# Patient Record
Sex: MALE | Race: BLACK OR AFRICAN AMERICAN | Employment: FULL TIME | URBAN - METROPOLITAN AREA
[De-identification: names, ages, dates, MRNs, and addresses within clinical notes are randomized per-mention and may not be internally consistent; named-entity substitution may affect disease eponyms.]

---

## 2023-03-20 ENCOUNTER — HOSPITAL ENCOUNTER (EMERGENCY)
Age: 23
Discharge: HOME | End: 2023-03-20
Attending: EMERGENCY MEDICINE

## 2023-03-20 VITALS
HEART RATE: 80 BPM | RESPIRATION RATE: 18 BRPM | HEIGHT: 68 IN | SYSTOLIC BLOOD PRESSURE: 120 MMHG | BODY MASS INDEX: 22.85 KG/M2 | OXYGEN SATURATION: 100 % | TEMPERATURE: 98 F | WEIGHT: 150.79 LBS | DIASTOLIC BLOOD PRESSURE: 79 MMHG

## 2023-03-20 DIAGNOSIS — R21 RASH: Primary | ICD-10-CM

## 2023-03-20 PROCEDURE — 99283 EMERGENCY DEPT VISIT LOW MDM: CPT

## 2023-03-20 RX ORDER — DIAPER,BRIEF,INFANT-TODD,DISP
EACH MISCELLANEOUS
Qty: 30 G | Refills: 0 | Status: SHIPPED | OUTPATIENT
Start: 2023-03-20 | End: 2023-03-27

## 2023-03-20 ASSESSMENT — ENCOUNTER SYMPTOMS
SHORTNESS OF BREATH: 0
NAUSEA: 0
EYE DISCHARGE: 0
COUGH: 0
CHEST TIGHTNESS: 0
BACK PAIN: 0
SINUS PAIN: 0
VOMITING: 0
ABDOMINAL PAIN: 0
EYE PAIN: 0

## 2023-03-20 ASSESSMENT — PAIN - FUNCTIONAL ASSESSMENT: PAIN_FUNCTIONAL_ASSESSMENT: NONE - DENIES PAIN

## 2023-03-20 NOTE — ED PROVIDER NOTES
ED Attending Attestation Note     Date of evaluation: 3/20/2023    This patient was seen by the resident. I have seen and examined the patient, agree with the workup, evaluation, management and diagnosis. The care plan has been discussed. My assessment reveals gliotic versus eczema rash. Patient need for dermatology follow-up. No evidence of infection. George Cho MD  03/20/23 [id number]

## 2023-03-20 NOTE — DISCHARGE INSTRUCTIONS
You are seen at the [facility] emergency department for a rash. Your symptoms most likely caused by eczema or psoriasis. You have been given a prescription for a steroid cream called hydrocortisone. Please pick this up from your pharmacy, and apply it to your rash daily. It is very important that you follow-up with a dermatologist for further care as soon as possible. The contact information for their clinic is attached to these discharge instructions. Please call their number to set up an appointment as soon as possible.

## 2023-03-20 NOTE — ED TRIAGE NOTES
Pt comes to the ED reporting a rash that has been having rash for the past nine months. Pt's rash is bilaterally on his calves, abdomen, and bilateral elbows. Pt reports his rash is itchy but denies any pain or fevers.

## 2023-03-20 NOTE — ED PROVIDER NOTES
[id number] RESIDENT NOTE       Date of evaluation: 3/20/2023    Chief Complaint     Rash      History of Present Illness     Blake Peabody is a 23 y.o. male who presents with 4 months of pruritic rash. Patient reports that the rash first appeared on his left anterior shin, but that he has since developed similar rashes over his right anterior shin, right hip, bilateral elbows. Intermittently been applying Aquaphor to his rash without relief. He denies any changes in his detergents, new pets, or any other new exposures that correlate with symptom onset. Does not have a history of eczema or psoriasis. He does not currently follow with a primary care provider and has never seen a dermatologist.  He has otherwise been in his normal state of health without fevers, chills, nausea, vomiting, chest pain, or shortness of breath. MEDICAL DECISION MAKING / ASSESSMENT / PLAN     INITIAL VITALS: BP: 120/79, Temp: 98 °F (36.7 °C), Heart Rate: 80, Resp: 18, SpO2: 100 %    Patient is a 23year old with no significant past medical history who presents with several months of a pruritic rash over multiple areas of his body. Physical exam is most consistent with eczema versus psoriasis. Patient will be given a prescription for hydrocortisone cream, as well as referral for PCP and dermatology follow-up. He was given return precautions, and expressed understanding of this plan. Will be discharged at this time. Is this patient to be included in the SEP-1 core measure due to severe sepsis or septic shock? No Exclusion criteria - the patient is NOT to be included for SEP-1 Core Measure due to: Infection is not suspected    Medical Decision Making  Risk  Prescription drug management. This patient was also evaluated by the attending physician. All care plans werediscussed and agreed upon. Clinical Impression     1.  Rash        Disposition     PATIENT REFERRED